# Patient Record
Sex: FEMALE | Race: OTHER | HISPANIC OR LATINO | URBAN - METROPOLITAN AREA
[De-identification: names, ages, dates, MRNs, and addresses within clinical notes are randomized per-mention and may not be internally consistent; named-entity substitution may affect disease eponyms.]

---

## 2023-10-28 ENCOUNTER — EMERGENCY (EMERGENCY)
Facility: HOSPITAL | Age: 73
LOS: 1 days | Discharge: HOME | End: 2023-10-28
Attending: EMERGENCY MEDICINE | Admitting: EMERGENCY MEDICINE
Payer: MEDICAID

## 2023-10-28 VITALS
DIASTOLIC BLOOD PRESSURE: 86 MMHG | WEIGHT: 169.98 LBS | TEMPERATURE: 100 F | RESPIRATION RATE: 20 BRPM | OXYGEN SATURATION: 96 % | SYSTOLIC BLOOD PRESSURE: 139 MMHG | HEIGHT: 63 IN | HEART RATE: 91 BPM

## 2023-10-28 DIAGNOSIS — Z98.890 OTHER SPECIFIED POSTPROCEDURAL STATES: Chronic | ICD-10-CM

## 2023-10-28 LAB
ALBUMIN SERPL ELPH-MCNC: 3.4 G/DL — SIGNIFICANT CHANGE UP (ref 3.3–5)
ALBUMIN SERPL ELPH-MCNC: 3.4 G/DL — SIGNIFICANT CHANGE UP (ref 3.3–5)
ALP SERPL-CCNC: 112 U/L — SIGNIFICANT CHANGE UP (ref 30–120)
ALP SERPL-CCNC: 112 U/L — SIGNIFICANT CHANGE UP (ref 30–120)
ALT FLD-CCNC: 17 U/L — SIGNIFICANT CHANGE UP (ref 10–60)
ALT FLD-CCNC: 17 U/L — SIGNIFICANT CHANGE UP (ref 10–60)
ANION GAP SERPL CALC-SCNC: 8 MMOL/L — SIGNIFICANT CHANGE UP (ref 5–17)
ANION GAP SERPL CALC-SCNC: 8 MMOL/L — SIGNIFICANT CHANGE UP (ref 5–17)
AST SERPL-CCNC: 14 U/L — SIGNIFICANT CHANGE UP (ref 10–40)
AST SERPL-CCNC: 14 U/L — SIGNIFICANT CHANGE UP (ref 10–40)
BASOPHILS # BLD AUTO: 0.04 K/UL — SIGNIFICANT CHANGE UP (ref 0–0.2)
BASOPHILS # BLD AUTO: 0.04 K/UL — SIGNIFICANT CHANGE UP (ref 0–0.2)
BASOPHILS NFR BLD AUTO: 0.4 % — SIGNIFICANT CHANGE UP (ref 0–2)
BASOPHILS NFR BLD AUTO: 0.4 % — SIGNIFICANT CHANGE UP (ref 0–2)
BILIRUB SERPL-MCNC: 0.6 MG/DL — SIGNIFICANT CHANGE UP (ref 0.2–1.2)
BILIRUB SERPL-MCNC: 0.6 MG/DL — SIGNIFICANT CHANGE UP (ref 0.2–1.2)
BUN SERPL-MCNC: 26 MG/DL — HIGH (ref 7–23)
BUN SERPL-MCNC: 26 MG/DL — HIGH (ref 7–23)
CALCIUM SERPL-MCNC: 9.7 MG/DL — SIGNIFICANT CHANGE UP (ref 8.4–10.5)
CALCIUM SERPL-MCNC: 9.7 MG/DL — SIGNIFICANT CHANGE UP (ref 8.4–10.5)
CHLORIDE SERPL-SCNC: 97 MMOL/L — SIGNIFICANT CHANGE UP (ref 96–108)
CHLORIDE SERPL-SCNC: 97 MMOL/L — SIGNIFICANT CHANGE UP (ref 96–108)
CO2 SERPL-SCNC: 28 MMOL/L — SIGNIFICANT CHANGE UP (ref 22–31)
CO2 SERPL-SCNC: 28 MMOL/L — SIGNIFICANT CHANGE UP (ref 22–31)
CREAT SERPL-MCNC: 0.86 MG/DL — SIGNIFICANT CHANGE UP (ref 0.5–1.3)
CREAT SERPL-MCNC: 0.86 MG/DL — SIGNIFICANT CHANGE UP (ref 0.5–1.3)
EGFR: 72 ML/MIN/1.73M2 — SIGNIFICANT CHANGE UP
EGFR: 72 ML/MIN/1.73M2 — SIGNIFICANT CHANGE UP
EOSINOPHIL # BLD AUTO: 0.08 K/UL — SIGNIFICANT CHANGE UP (ref 0–0.5)
EOSINOPHIL # BLD AUTO: 0.08 K/UL — SIGNIFICANT CHANGE UP (ref 0–0.5)
EOSINOPHIL NFR BLD AUTO: 0.8 % — SIGNIFICANT CHANGE UP (ref 0–6)
EOSINOPHIL NFR BLD AUTO: 0.8 % — SIGNIFICANT CHANGE UP (ref 0–6)
FLUAV AG NPH QL: SIGNIFICANT CHANGE UP
FLUAV AG NPH QL: SIGNIFICANT CHANGE UP
FLUBV AG NPH QL: SIGNIFICANT CHANGE UP
FLUBV AG NPH QL: SIGNIFICANT CHANGE UP
GLUCOSE SERPL-MCNC: 318 MG/DL — HIGH (ref 70–99)
GLUCOSE SERPL-MCNC: 318 MG/DL — HIGH (ref 70–99)
HCT VFR BLD CALC: 38.9 % — SIGNIFICANT CHANGE UP (ref 34.5–45)
HCT VFR BLD CALC: 38.9 % — SIGNIFICANT CHANGE UP (ref 34.5–45)
HGB BLD-MCNC: 12.6 G/DL — SIGNIFICANT CHANGE UP (ref 11.5–15.5)
HGB BLD-MCNC: 12.6 G/DL — SIGNIFICANT CHANGE UP (ref 11.5–15.5)
IMM GRANULOCYTES NFR BLD AUTO: 0.3 % — SIGNIFICANT CHANGE UP (ref 0–0.9)
IMM GRANULOCYTES NFR BLD AUTO: 0.3 % — SIGNIFICANT CHANGE UP (ref 0–0.9)
LACTATE SERPL-SCNC: 1.4 MMOL/L — SIGNIFICANT CHANGE UP (ref 0.7–2)
LACTATE SERPL-SCNC: 1.4 MMOL/L — SIGNIFICANT CHANGE UP (ref 0.7–2)
LIDOCAIN IGE QN: 31 U/L — SIGNIFICANT CHANGE UP (ref 16–77)
LIDOCAIN IGE QN: 31 U/L — SIGNIFICANT CHANGE UP (ref 16–77)
LYMPHOCYTES # BLD AUTO: 1.45 K/UL — SIGNIFICANT CHANGE UP (ref 1–3.3)
LYMPHOCYTES # BLD AUTO: 1.45 K/UL — SIGNIFICANT CHANGE UP (ref 1–3.3)
LYMPHOCYTES # BLD AUTO: 14 % — SIGNIFICANT CHANGE UP (ref 13–44)
LYMPHOCYTES # BLD AUTO: 14 % — SIGNIFICANT CHANGE UP (ref 13–44)
MAGNESIUM SERPL-MCNC: 1.8 MG/DL — SIGNIFICANT CHANGE UP (ref 1.6–2.6)
MAGNESIUM SERPL-MCNC: 1.8 MG/DL — SIGNIFICANT CHANGE UP (ref 1.6–2.6)
MCHC RBC-ENTMCNC: 25.6 PG — LOW (ref 27–34)
MCHC RBC-ENTMCNC: 25.6 PG — LOW (ref 27–34)
MCHC RBC-ENTMCNC: 32.4 GM/DL — SIGNIFICANT CHANGE UP (ref 32–36)
MCHC RBC-ENTMCNC: 32.4 GM/DL — SIGNIFICANT CHANGE UP (ref 32–36)
MCV RBC AUTO: 78.9 FL — LOW (ref 80–100)
MCV RBC AUTO: 78.9 FL — LOW (ref 80–100)
MONOCYTES # BLD AUTO: 0.73 K/UL — SIGNIFICANT CHANGE UP (ref 0–0.9)
MONOCYTES # BLD AUTO: 0.73 K/UL — SIGNIFICANT CHANGE UP (ref 0–0.9)
MONOCYTES NFR BLD AUTO: 7.1 % — SIGNIFICANT CHANGE UP (ref 2–14)
MONOCYTES NFR BLD AUTO: 7.1 % — SIGNIFICANT CHANGE UP (ref 2–14)
NEUTROPHILS # BLD AUTO: 8.01 K/UL — HIGH (ref 1.8–7.4)
NEUTROPHILS # BLD AUTO: 8.01 K/UL — HIGH (ref 1.8–7.4)
NEUTROPHILS NFR BLD AUTO: 77.4 % — HIGH (ref 43–77)
NEUTROPHILS NFR BLD AUTO: 77.4 % — HIGH (ref 43–77)
NRBC # BLD: 0 /100 WBCS — SIGNIFICANT CHANGE UP (ref 0–0)
NRBC # BLD: 0 /100 WBCS — SIGNIFICANT CHANGE UP (ref 0–0)
PLATELET # BLD AUTO: 219 K/UL — SIGNIFICANT CHANGE UP (ref 150–400)
PLATELET # BLD AUTO: 219 K/UL — SIGNIFICANT CHANGE UP (ref 150–400)
POTASSIUM SERPL-MCNC: 4.1 MMOL/L — SIGNIFICANT CHANGE UP (ref 3.5–5.3)
POTASSIUM SERPL-MCNC: 4.1 MMOL/L — SIGNIFICANT CHANGE UP (ref 3.5–5.3)
POTASSIUM SERPL-SCNC: 4.1 MMOL/L — SIGNIFICANT CHANGE UP (ref 3.5–5.3)
POTASSIUM SERPL-SCNC: 4.1 MMOL/L — SIGNIFICANT CHANGE UP (ref 3.5–5.3)
PROT SERPL-MCNC: 7.7 G/DL — SIGNIFICANT CHANGE UP (ref 6–8.3)
PROT SERPL-MCNC: 7.7 G/DL — SIGNIFICANT CHANGE UP (ref 6–8.3)
RBC # BLD: 4.93 M/UL — SIGNIFICANT CHANGE UP (ref 3.8–5.2)
RBC # BLD: 4.93 M/UL — SIGNIFICANT CHANGE UP (ref 3.8–5.2)
RBC # FLD: 13.1 % — SIGNIFICANT CHANGE UP (ref 10.3–14.5)
RBC # FLD: 13.1 % — SIGNIFICANT CHANGE UP (ref 10.3–14.5)
RSV RNA NPH QL NAA+NON-PROBE: SIGNIFICANT CHANGE UP
RSV RNA NPH QL NAA+NON-PROBE: SIGNIFICANT CHANGE UP
S PYO DNA THROAT QL NAA+PROBE: DETECTED
S PYO DNA THROAT QL NAA+PROBE: DETECTED
SARS-COV-2 RNA SPEC QL NAA+PROBE: SIGNIFICANT CHANGE UP
SARS-COV-2 RNA SPEC QL NAA+PROBE: SIGNIFICANT CHANGE UP
SODIUM SERPL-SCNC: 133 MMOL/L — LOW (ref 135–145)
SODIUM SERPL-SCNC: 133 MMOL/L — LOW (ref 135–145)
WBC # BLD: 10.34 K/UL — SIGNIFICANT CHANGE UP (ref 3.8–10.5)
WBC # BLD: 10.34 K/UL — SIGNIFICANT CHANGE UP (ref 3.8–10.5)
WBC # FLD AUTO: 10.34 K/UL — SIGNIFICANT CHANGE UP (ref 3.8–10.5)
WBC # FLD AUTO: 10.34 K/UL — SIGNIFICANT CHANGE UP (ref 3.8–10.5)

## 2023-10-28 PROCEDURE — 99285 EMERGENCY DEPT VISIT HI MDM: CPT

## 2023-10-28 RX ORDER — ONDANSETRON 8 MG/1
4 TABLET, FILM COATED ORAL ONCE
Refills: 0 | Status: COMPLETED | OUTPATIENT
Start: 2023-10-28 | End: 2023-10-28

## 2023-10-28 RX ORDER — ACETAMINOPHEN 500 MG
1000 TABLET ORAL ONCE
Refills: 0 | Status: COMPLETED | OUTPATIENT
Start: 2023-10-28 | End: 2023-10-28

## 2023-10-28 RX ORDER — IOHEXOL 300 MG/ML
30 INJECTION, SOLUTION INTRAVENOUS ONCE
Refills: 0 | Status: COMPLETED | OUTPATIENT
Start: 2023-10-28 | End: 2023-10-28

## 2023-10-28 RX ORDER — KETOROLAC TROMETHAMINE 30 MG/ML
15 SYRINGE (ML) INJECTION ONCE
Refills: 0 | Status: DISCONTINUED | OUTPATIENT
Start: 2023-10-28 | End: 2023-10-28

## 2023-10-28 RX ORDER — AZITHROMYCIN 500 MG/1
500 TABLET, FILM COATED ORAL ONCE
Refills: 0 | Status: COMPLETED | OUTPATIENT
Start: 2023-10-28 | End: 2023-10-28

## 2023-10-28 RX ORDER — SODIUM CHLORIDE 9 MG/ML
1000 INJECTION INTRAMUSCULAR; INTRAVENOUS; SUBCUTANEOUS ONCE
Refills: 0 | Status: COMPLETED | OUTPATIENT
Start: 2023-10-28 | End: 2023-10-28

## 2023-10-28 RX ADMIN — Medication 1000 MILLIGRAM(S): at 23:42

## 2023-10-28 RX ADMIN — Medication 15 MILLIGRAM(S): at 23:20

## 2023-10-28 RX ADMIN — ONDANSETRON 4 MILLIGRAM(S): 8 TABLET, FILM COATED ORAL at 22:28

## 2023-10-28 RX ADMIN — Medication 400 MILLIGRAM(S): at 22:28

## 2023-10-28 RX ADMIN — Medication 1000 MILLIGRAM(S): at 22:50

## 2023-10-28 RX ADMIN — Medication 15 MILLIGRAM(S): at 22:27

## 2023-10-28 RX ADMIN — SODIUM CHLORIDE 1000 MILLILITER(S): 9 INJECTION INTRAMUSCULAR; INTRAVENOUS; SUBCUTANEOUS at 22:28

## 2023-10-28 RX ADMIN — IOHEXOL 30 MILLILITER(S): 300 INJECTION, SOLUTION INTRAVENOUS at 22:27

## 2023-10-28 RX ADMIN — SODIUM CHLORIDE 1000 MILLILITER(S): 9 INJECTION INTRAMUSCULAR; INTRAVENOUS; SUBCUTANEOUS at 23:31

## 2023-10-28 RX ADMIN — AZITHROMYCIN 255 MILLIGRAM(S): 500 TABLET, FILM COATED ORAL at 23:36

## 2023-10-28 RX ADMIN — SODIUM CHLORIDE 1000 MILLILITER(S): 9 INJECTION INTRAMUSCULAR; INTRAVENOUS; SUBCUTANEOUS at 23:37

## 2023-10-28 NOTE — ED PROVIDER NOTE - OBJECTIVE STATEMENT
73 y.o. F with multiple medical complaints 73 y.o. F with multiple medical complaints, reports chronic intermittent abd pains, having the same now for about 3 days,

## 2023-10-28 NOTE — ED PROVIDER NOTE - NSFOLLOWUPINSTRUCTIONS_ED_ALL_ED_FT
Faringitis estreptocócica, en adultos  Strep Throat, Adult  La faringitis estreptocócica es derrick infección en la garganta causada por bacterias. Es frecuente mattie los meses de frío del año. Afecta principalmente a los niños que tienen entre 5 y 15 años. Sin embargo, las personas de todas las edades pueden contagiarse en cualquier momento del año. La infección se transmite de derrick persona a otra (es contagiosa) a través de la tos, el estornudo o el contacto cercano.    El médico puede usar otras palabras para describir la infección. Cuando la faringitis estreptocócica afecta las amígdalas, se denomina amigdalitis. Cuando afecta la parte posterior de la garganta, se denomina faringitis.    ¿Cuáles son las causas?  Esta afección es provocada por las bacterias Streptococcus pyogenes.    ¿Qué incrementa el riesgo?  Es más probable que sufra esta afección si:  Cuida a niños en edad escolar o está cerca de niños en edad escolar. Los niños son más propensos a contraer faringitis estreptocócica y pueden transmitírsela a otras personas.  Pasa tiempo en lugares en los que hay mucha gente, donde la infección se puede diseminar fácilmente.  Tiene contacto cercano con alguien que tiene faringitis estreptocócica.  ¿Cuáles son los signos o síntomas?  Los síntomas de esta afección incluyen:  Fiebre o escalofríos.  Enrojecimiento, inflamación o dolor de las amígdalas o la garganta.  Dolor o dificultad para tragar.  [place] sera o berny en las amígdalas o la garganta.  Ganglios dolorosos al tacto en el lu o debajo de la mandíbula.  Mal aliento.  Erupción sushma en todo el cuerpo. [place] es poco frecuente.  ¿Cómo se diagnostica?  A sample is taken from a person's throat.   Esta afección se diagnostica mediante estudios para detectar la presencia y la cantidad de bacterias que causan la faringitis estreptocócica. Estos son:  Prueba rápida para estreptococos. Le pasan un hisopo por la garganta para extraer derrick muestra y se comprueba la presencia de bacterias. Generalmente, los resultados están listos en cuestión de minutos.  Cultivo de secreciones de la garganta. Le pasan un hisopo por la garganta para extraer derrick muestra. La muestra se coloca en derrick taza que permite que las bacterias se reproduzcan. Generalmente, los resultados están listos en 1 o 2 días.  ¿Cómo se trata?  El tratamiento de esta afección puede incluir:  Medicamentos que destruyen microbios (antibióticos).  Medicamentos que alivian el dolor o la fiebre. Estos incluyen:  Ibuprofeno o acetaminofeno.  Aspirina, solo para las personas mayores de 18 años.  Pastillas para la garganta.  Aerosoles para la garganta.  Siga estas indicaciones en jenkins casa:  Medicamentos    A prescription pill bottle with an example of a pill.  Use los medicamentos de venta ashwin y los recetados solamente dixie se lo haya indicado el médico.  [place] el antibiótico dixie se lo haya indicado el médico. No deje de barbara el antibiótico aunque comience a sentirse mejor.  Comida y bebida    A diet of soft foods, including applesauce, yogurt, ice cream, and a smoothie.  Si tiene dificultad para tragar, intente consumir alimentos blandos hasta que el dolor de garganta mejore.  [place]isha suficiente líquido dixie para mantener la orina de color amarillo pálido.  Para aliviar el dolor, puede consumir lo siguiente:  Líquidos calientes, dixie sopa y té.  Líquidos fríos, dixie postres helados o helados de agua.  Indicaciones generales    Apblo gárgaras con derrick mezcla de agua y sal 3 o 4 veces al día, o cuando sea necesario. Para preparar la mezcla de agua y sal, disuelva totalmente de ½ a 1 cucharadita (de 3 a 6 g) de sal en 1 taza (237 ml) de agua tibia.  Descanse mucho.  No concurra a la escuela o al trabajo hasta que haya tomado los antibióticos mattie 24 horas.  No consuma ningún producto que contenga nicotina o tabaco. Estos productos incluyen cigarrillos, tabaco para mascar y aparatos de vapeo, dixie los cigarrillos electrónicos. Si necesita ayuda para dejar de fumar, consulte al médico.  Es jenkins responsabilidad retirar los resultados de la prueba. Consulte al médico o pregunte en el departamento donde se realiza la prueba cuándo estarán listos los resultados.  Concurra a todas las visitas de seguimiento. [place] es importante.  ¿Cómo se luciano?  Washing hands with soap and water.  No comparta alimentos, tazas ni artículos personales que podrían contagiar la infección a otras personas.  Lávese las nuvia frecuentemente con agua y jabón mattie al menos 20 segundos. Use desinfectante para nuvia si no dispone de agua y jabón. Asegúrese de que todas las personas que viven en jenkins casa se laven bria las nuvia.  Pablo que también se emile los estudios los miembros de la davis que tengan dolor de garganta o fiebre. Pueden necesitar antibióticos si tienen faringitis estreptocócica.  Comuníquese con un médico si:  Tiene hinchazón en el lu que se hace cada vez más yolanda.  Aparece derrick erupción cutánea, tos o dolor de oídos.  Tose y expectora derrick mucosidad espesa de color edgar o amarillo amarronado, o con phillip.  Tiene dolor o molestias que no mejoran con medicamentos.  Los síntomas parecen empeorar.  Tiene fiebre.  Solicite ayuda de inmediato si:  Tiene síntomas nuevos, dixie vómitos, dolor de alonzo intenso, rigidez o dolor en el lu, dolor en el pecho o falta de aire.  Le duele mucho la garganta, babea o tiene cambios en la visión.  Siente que el lu se le hincha o que la piel de matt isaías se vuelve sushma y sensible.  Tiene signos de deshidratación, dixie cansancio (fatiga), sequedad en la boca y disminución de la micción.  Comienza a sentir mucho sueño, o no puede despertarse bria.  Las articulaciones están enrojecidas o le duelen.  Estos síntomas pueden representar un problema grave que constituye derrick emergencia. No espere a chay si los síntomas desaparecen. Solicite atención médica de inmediato. Comuníquese con el servicio de emergencias de jenkins localidad (911 en los [place]). No conduzca por nancy propios medios hasta el hospital.    Resumen  La faringitis estreptocócica es derrick infección en la garganta causada por las bacterias llamadas Streptococcus pyogenes. La infección se transmite de derrick persona a otra (es contagiosa) a través de la tos, el estornudo o el contacto cercano.  [place] los medicamentos, incluidos los antibióticos, dixie se lo haya indicado el médico. No deje de barbara el antibiótico aunque comience a sentirse mejor.  Para evitar que se propaguen los microbios, lávese bria las nuvia con agua y jabón. Pídales a otras personas que también lo emile. No comparta los alimentos, las tazas ni los artículos personales.  Busque ayuda de inmediato si tiene síntomas nuevos, dixie vómitos, dolor de alonzo intenso, rigidez o dolor en el lu, dolor en el pecho o dificultad para respirar.  Esta información no tiene dixie fin reemplazar el consejo del médico. Asegúrese de hacerle al médico cualquier pregunta que tenga.      Dolor abdominal en los adultos  Abdominal Pain, Adult  El dolor en el abdomen (dolor abdominal) puede tener muchas causas. A menudo, el dolor abdominal no es grave y mejora sin tratamiento o con tratamiento en la casa. Sin embargo, a veces el dolor abdominal es grave.    El médico le hará preguntas sobre nancy antecedentes médicos y le hará un examen físico para tratar de determinar la causa del dolor abdominal.    Siga estas instrucciones en jenkins casa:    Medicamentos    Use los medicamentos de venta ashwin y los recetados solamente dixie se lo haya indicado el médico.  No tome un laxante a menos que se lo haya indicado el médico.  Instrucciones generales    Controle jenkins afección para detectar cualquier cambio.  [place] suficiente líquido dixie para mantener la orina de color amarillo pálido.  Concurra a todas las visitas de seguimiento dixie se lo haya indicado el médico. [place] es importante.  Comuníquese con un médico si:  El dolor abdominal cambia o empeora.  No tiene apetito o baja de peso sin proponérselo.  Está estreñido o tiene diarrea mattie más de 2 o 3 isabel.  Tiene dolor cuando orina o defeca.  El dolor abdominal lo despierta de noche.  El dolor empeora con las comidas, después de comer o con determinados alimentos.  Tiene vómitos y no puede retener nada de lo que ingiere.  Tiene fiebre.  Observa phillip en la orina.  Solicite ayuda inmediatamente si:  El dolor no desaparece tan pronto dixie el médico le dijo que era esperable.  No puede dejar de vomitar.  El dolor se siente solo en zonas del abdomen, dixie el lado derecho o la parte inferior izquierda del abdomen. Si se localiza en la isaías derecha, posiblemente podría tratarse de apendicitis.  Las heces son sanguinolentas o de color jaydon, o de aspecto alquitranado.  Tiene dolor intenso, cólicos o distensión abdominal.  Tiene signos de deshidratación, dixie los siguientes:  Orina de color oscuro, muy escasa o falta de orina.  Labios agrietados.  Sequedad de boca.  Ojos hundidos.  Somnolencia.  Debilidad.  Tiene dificultad para respirar o dolor en el pecho.  Resumen  A menudo, el dolor abdominal no es grave y mejora sin tratamiento o con tratamiento en la casa. Sin embargo, a veces el dolor abdominal es grave.  Controle jenkins afección para detectar cualquier cambio.  Use los medicamentos de venta ashwin y los recetados solamente dixie se lo haya indicado el médico.  Comuníquese con un médico si el dolor abdominal cambia o empeora.  Busque ayuda de inmediato si tiene dolor intenso, cólicos o distensión abdominal.  Esta información no tiene dixie fin reemplazar el consejo del médico. Asegúrese de hacerle al médico cualquier pregunta que tenga.

## 2023-10-28 NOTE — ED PROVIDER NOTE - CLINICAL SUMMARY MEDICAL DECISION MAKING FREE TEXT BOX
abd pain x 3d with nausea, and sore throat with fever since yesterday with HA - iv, labs, rvp, strep test, ct a/p with po/iv contrast, pt also out of her dm meds, but isn't exactly sure what she takes

## 2023-10-28 NOTE — ED PROVIDER NOTE - PROGRESS NOTE DETAILS
discussed all results with pt and family - will provide copy, pt feels some improvement, would like to go home, will send rx for zofran, zithro for strep. pt has pill pack for metformin 850 that she reports taking 1x/day - advise will rx metformin XL 1000mg once a day but will need to wait 2d given that had ct with iv contrast. pt unsure of what her insulin is, advise pt and family must get in touch with pcp and take appointment, will also provide information for [facility] as alternate

## 2023-10-28 NOTE — ED PROVIDER NOTE - PATIENT PORTAL LINK FT
You can access the FollowMyHealth Patient Portal offered by [facility] by registering at the following website: [email or web address]. By joining [email or web address] FollowMyHealth portal, you will also be able to view your health information using other applications (apps) compatible with our system.

## 2023-10-28 NOTE — ED PROVIDER NOTE - MDM ORDERS SUBMITTED SELECTION
Labs/Imaging Studies/Medications Nasal Turnover Hinge Flap Text: The defect edges were debeveled with a #15 scalpel blade.  Given the size, depth, location of the defect and the defect being full thickness a nasal turnover hinge flap was deemed most appropriate.  Using a sterile surgical marker, an appropriate hinge flap was drawn incorporating the defect. The area thus outlined was incised with a #15 scalpel blade. The flap was designed to recreate the nasal mucosal lining and the alar rim. The skin margins were undermined to an appropriate distance in all directions utilizing iris scissors.

## 2023-10-28 NOTE — ED PROVIDER NOTE - NSFOLLOWUPCLINICS_GEN_ALL_ED_FT
[facility]  Family Medicine  [address] [place] [address]  Phone: [phone]  Fax:   Follow Up Time: 4-6 Days

## 2023-10-28 NOTE — ED ADULT NURSE NOTE - CHIEF COMPLAINT QUOTE
c/o abd pain "for over a year when my diabetes is bad. I ran out of medication." [facility]

## 2023-10-28 NOTE — ED ADULT TRIAGE NOTE - CHIEF COMPLAINT QUOTE
c/o abd pain "for over a year when my diabetes is bad. I ran out of medication." Last checked glucose 2 months ago in [facility].

## 2023-10-28 NOTE — ED ADULT TRIAGE NOTE - PATIENT/CAREGIVER ACCEPTED INTERPRETER SERVICES
Pt called stating that he has had a headache/migraine for 4 days now. Worse when up walking or having to bend over. While bending over pt feels like he is congested. He is taking regular Tylenol. Pain is mainly on left side and left back side. He is having slight dizziness. Asking about an appt or if something can be called into the pharmacy for the migraines for him. Last OV 6/8/21. No follow up scheduled. yes

## 2023-10-29 VITALS
SYSTOLIC BLOOD PRESSURE: 127 MMHG | HEART RATE: 69 BPM | TEMPERATURE: 98 F | OXYGEN SATURATION: 97 % | RESPIRATION RATE: 18 BRPM | DIASTOLIC BLOOD PRESSURE: 82 MMHG

## 2023-10-29 PROCEDURE — 74177 CT ABD & PELVIS W/CONTRAST: CPT | Mod: 26,MA

## 2023-10-29 PROCEDURE — 74177 CT ABD & PELVIS W/CONTRAST: CPT | Mod: MA

## 2023-10-29 PROCEDURE — 85025 COMPLETE CBC W/AUTO DIFF WBC: CPT

## 2023-10-29 PROCEDURE — 83605 ASSAY OF LACTIC ACID: CPT

## 2023-10-29 PROCEDURE — 96375 TX/PRO/DX INJ NEW DRUG ADDON: CPT

## 2023-10-29 PROCEDURE — 96361 HYDRATE IV INFUSION ADD-ON: CPT

## 2023-10-29 PROCEDURE — 99284 EMERGENCY DEPT VISIT MOD MDM: CPT | Mod: 25

## 2023-10-29 PROCEDURE — 87040 BLOOD CULTURE FOR BACTERIA: CPT

## 2023-10-29 PROCEDURE — 83735 ASSAY OF MAGNESIUM: CPT

## 2023-10-29 PROCEDURE — 96367 TX/PROPH/DG ADDL SEQ IV INF: CPT

## 2023-10-29 PROCEDURE — 96376 TX/PRO/DX INJ SAME DRUG ADON: CPT

## 2023-10-29 PROCEDURE — 80053 COMPREHEN METABOLIC PANEL: CPT

## 2023-10-29 PROCEDURE — 96365 THER/PROPH/DIAG IV INF INIT: CPT | Mod: XU

## 2023-10-29 PROCEDURE — 87651 STREP A DNA AMP PROBE: CPT

## 2023-10-29 PROCEDURE — 87798 DETECT AGENT NOS DNA AMP: CPT

## 2023-10-29 PROCEDURE — 82962 GLUCOSE BLOOD TEST: CPT

## 2023-10-29 PROCEDURE — 83690 ASSAY OF LIPASE: CPT

## 2023-10-29 PROCEDURE — 36415 COLL VENOUS BLD VENIPUNCTURE: CPT

## 2023-10-29 PROCEDURE — 87637 SARSCOV2&INF A&B&RSV AMP PRB: CPT

## 2023-10-29 RX ORDER — AZITHROMYCIN 500 MG/1
1 TABLET, FILM COATED ORAL
Qty: 5 | Refills: 0
Start: 2023-10-29 | End: 2023-11-02

## 2023-10-29 RX ORDER — ONDANSETRON 8 MG/1
1 TABLET, FILM COATED ORAL
Qty: 1 | Refills: 0
Start: 2023-10-29 | End: 2023-10-31

## 2023-10-29 RX ORDER — METFORMIN HYDROCHLORIDE 850 MG/1
1 TABLET ORAL
Qty: 30 | Refills: 0
Start: 2023-10-29 | End: 2023-11-27

## 2023-10-29 RX ADMIN — SODIUM CHLORIDE 1000 MILLILITER(S): 9 INJECTION INTRAMUSCULAR; INTRAVENOUS; SUBCUTANEOUS at 00:37

## 2023-10-29 RX ADMIN — AZITHROMYCIN 500 MILLIGRAM(S): 500 TABLET, FILM COATED ORAL at 00:31

## 2023-10-29 RX ADMIN — ONDANSETRON 4 MILLIGRAM(S): 8 TABLET, FILM COATED ORAL at 00:10

## 2023-11-03 LAB
CULTURE RESULTS: SIGNIFICANT CHANGE UP
SPECIMEN SOURCE: SIGNIFICANT CHANGE UP